# Patient Record
Sex: FEMALE | Race: WHITE | NOT HISPANIC OR LATINO | URBAN - NONMETROPOLITAN AREA
[De-identification: names, ages, dates, MRNs, and addresses within clinical notes are randomized per-mention and may not be internally consistent; named-entity substitution may affect disease eponyms.]

---

## 2017-09-25 PROBLEM — Z96.1: Noted: 2017-09-25

## 2017-09-25 PROBLEM — H16.223: Noted: 2017-09-25

## 2017-09-25 PROBLEM — H52.4: Noted: 2018-11-19

## 2017-09-25 PROBLEM — H52.223: Noted: 2018-11-19

## 2017-09-25 PROBLEM — H43.813: Noted: 2017-09-25

## 2017-09-25 PROBLEM — H52.01: Noted: 2018-11-19

## 2018-11-19 PROBLEM — H52.4: Noted: 2018-11-19

## 2018-11-19 PROBLEM — H43.813: Noted: 2017-09-25

## 2018-11-19 PROBLEM — Z96.1: Noted: 2017-09-25

## 2018-11-19 PROBLEM — H52.01: Noted: 2018-11-19

## 2018-11-19 PROBLEM — H52.223: Noted: 2018-11-19

## 2018-11-19 PROBLEM — H16.223: Noted: 2017-09-25

## 2019-11-20 ENCOUNTER — IMPORTED ENCOUNTER (OUTPATIENT)
Dept: URBAN - NONMETROPOLITAN AREA CLINIC 1 | Facility: CLINIC | Age: 74
End: 2019-11-20

## 2019-11-20 PROCEDURE — 92014 COMPRE OPH EXAM EST PT 1/>: CPT

## 2019-11-20 PROCEDURE — 92015 DETERMINE REFRACTIVE STATE: CPT

## 2019-11-20 NOTE — PATIENT DISCUSSION
Hyperopia/astig/presby-  Discussed refractive status in detail w/ pt today-  MR done new GLRx given. Recommended purchasing progressive glasses even if she only uses it to watch TV as this is her biggest complaint. -  Monitor yearly or PRN P/C IOL OU-  Discussed findings in detail w/ pt today-  Stable doing well-  Hx of Yag PC OU open capsule noted OD and residual PCO noted. No complaint noted today. -  Monitor yearly or PRN. DASHA OU-  Discussed findings in detail w/ pt today-  Signs/symptoms associated discussed-  Continue ATs OU BID-QID PRN-  Continue Restasis OU BID new Rx sent.  -  Continue to monitor PRNBorderline DM-  Discussed findings w/ pt today-  Stressed importance of good BS control and diet-  Currently not on any medications-  No BDR noted on exam today -  Will continue to monitor closely; 's Notes:   11/20/19DFE  11/20/19OCTOptos

## 2022-04-09 ASSESSMENT — TONOMETRY
OS_IOP_MMHG: 14
OD_IOP_MMHG: 14

## 2022-04-09 ASSESSMENT — VISUAL ACUITY
OD_SC: 20/30-
OS_SC: 20/30-

## 2024-10-24 ENCOUNTER — NEW PATIENT (OUTPATIENT)
Dept: URBAN - NONMETROPOLITAN AREA CLINIC 1 | Facility: CLINIC | Age: 79
End: 2024-10-24

## 2024-10-24 DIAGNOSIS — H52.4: ICD-10-CM

## 2024-10-24 DIAGNOSIS — H52.01: ICD-10-CM

## 2024-10-24 DIAGNOSIS — H52.223: ICD-10-CM

## 2024-10-24 PROCEDURE — 92015 DETERMINE REFRACTIVE STATE: CPT

## 2024-10-24 PROCEDURE — 92004 COMPRE OPH EXAM NEW PT 1/>: CPT

## 2025-02-11 ENCOUNTER — EMERGENCY VISIT (OUTPATIENT)
Age: 80
End: 2025-02-11

## 2025-02-11 DIAGNOSIS — B00.52: ICD-10-CM

## 2025-02-11 PROCEDURE — 99213 OFFICE O/P EST LOW 20 MIN: CPT

## 2025-02-25 ENCOUNTER — FOLLOW UP (OUTPATIENT)
Age: 80
End: 2025-02-25

## 2025-02-25 DIAGNOSIS — B00.52: ICD-10-CM

## 2025-02-25 PROCEDURE — 99213 OFFICE O/P EST LOW 20 MIN: CPT
